# Patient Record
Sex: MALE | Race: WHITE
[De-identification: names, ages, dates, MRNs, and addresses within clinical notes are randomized per-mention and may not be internally consistent; named-entity substitution may affect disease eponyms.]

---

## 2018-01-17 ENCOUNTER — HOSPITAL ENCOUNTER (OUTPATIENT)
Dept: HOSPITAL 95 - ORSCSDS | Age: 83
Discharge: HOME | End: 2018-01-17
Payer: MEDICARE

## 2018-01-17 VITALS — WEIGHT: 185.25 LBS | HEIGHT: 70 IN | BODY MASS INDEX: 26.52 KG/M2

## 2018-01-17 DIAGNOSIS — D12.4: ICD-10-CM

## 2018-01-17 DIAGNOSIS — D50.9: Primary | ICD-10-CM

## 2018-01-17 DIAGNOSIS — Z79.899: ICD-10-CM

## 2018-01-17 DIAGNOSIS — D12.5: ICD-10-CM

## 2018-01-17 DIAGNOSIS — I10: ICD-10-CM

## 2018-01-17 DIAGNOSIS — Z87.891: ICD-10-CM

## 2018-01-17 DIAGNOSIS — K57.30: ICD-10-CM

## 2018-01-17 PROCEDURE — 0DBP8ZX EXCISION OF RECTUM, VIA NATURAL OR ARTIFICIAL OPENING ENDOSCOPIC, DIAGNOSTIC: ICD-10-PCS | Performed by: INTERNAL MEDICINE

## 2018-01-17 PROCEDURE — 0DBN8ZX EXCISION OF SIGMOID COLON, VIA NATURAL OR ARTIFICIAL OPENING ENDOSCOPIC, DIAGNOSTIC: ICD-10-PCS | Performed by: INTERNAL MEDICINE

## 2018-01-17 PROCEDURE — 0DBM8ZX EXCISION OF DESCENDING COLON, VIA NATURAL OR ARTIFICIAL OPENING ENDOSCOPIC, DIAGNOSTIC: ICD-10-PCS | Performed by: INTERNAL MEDICINE

## 2020-02-09 NOTE — NUR
1729 PT ADMITTED TO MEDICAL FLOOR VIA GURNEY. PT TRANSFERED TO BED
INDEPENDENTLY. PT ON RA, NO RESPIRATORY DISTRESS, OCCASSIONAL NONPRODUCTIVE
COUGH, PT DENIES SOB. PER ED RN AND DOCUMENTATION, FLU SWAB COMPLETED AT
[facility] AND WAS NEGATIVE, ORDER CANCELED. MED REC UPDATED.

## 2020-02-10 NOTE — NUR
NIGHT SHIFT SUMMARY
NO ACUTE CHANGES THIS SHIFT. PT AAOX4 AND INDEPENDENT IN ROOM. PLEASANT AND
COOPERATIVE WITH CARE. DENIES PAIN, N/V. BREATHING IMPROVED PER PT. LUNG
SOUNDS DIM IN BASES WITH SOME FAINT RHONCHI NOTED. VSS, WILL CONTINUE TO
MONITOR.

## 2020-02-10 NOTE — NUR
THE PATIENT GIVES THIS STUDENT NURSE PERMISSION TO PARTICIPATE IN HIS CARE FOR
THE MORNING OF 02/11/2020

## 2020-02-10 NOTE — NUR
SHIFT SUMMARY.
AOX4, INDEPENDENT IN ROOM, PLEASANT AND COOPERATIVE WITH CARE. LUNGS CLEAR.
BREATHING NON LABORED. RA. NO NEW CHANGES OR CONCERNS.

## 2020-02-11 NOTE — NUR
SHIFT SUMMARY
ASSUMED CARE OF PT @0505. PREVIOUS NURSE, JOSE ANTONIO RAMIREZ, REPORTED HE GAVE 500ML BOLUS
FOR A BP OF 87/47, WHICH INCREASED BP TO 99/60. DENIES DYSPNEA @REST, SPO2
>90% ON RA, E/U RESPIRATIONS. IND IN ROOM. DENIES N/V OR PAIN. CALL LIGHT IN
REACH.

## 2021-07-08 NOTE — NUR
ADMIT NOTE
 
PT ARRIVED TO PCU FROM ED VIA ED STRETCHER AT APPROX 2150. PT WAS SLID BY 4
STAFF FROM ED STRETCHER TO PCU BED. PT A&OX4. SP02>92% ON RA. SOB W/ EXERTION.
PT STATES HE "FEELS TIRED." TELEMETRY READS AFIB W/ BIGIMANY PVCS, HR 80'S,
PER TELEMETRY TECH. PT USED URINAL AT BEDSIDE TO VOID 50 MLS OF CLEAR, YELLOW
URINE. PT HAD WOUNDS ON L FORE ARM AND ABOVE R UPPER EYE FROM PREVIOUS FALL.
ARM WOUND CLEANED AND DRESSED. PT ARRIVED W/ PRBCS CURRENTLY INFUSING. PT
ORIENTED TO ROOM, CALL LIGHT. WIFE CALLED BY THIS RN AND UPDATED. CALL LIGHT
IN REACH.

## 2021-07-09 NOTE — NUR
SHIFT SUMMARY
 
NO ACUTE CHANGES THIS SHIFT. PT A&OX4, PLEASANT. SP02>92% ON RA. TELEMETRY
READS AFIB W/ BIGEMINY PVCS, HR 80'S-90'S. PT DENIES PAIN. PT VOIDED MULTIPLE
TIMES, SMALL AMOUNTS OF CLEAR YELLOW URINE INTO URINAL AT BEDSIDE. 2 UNITS
PRBC INFUSED PER SHIFT. PT HAS ABRASION ON L FOREARM, DRESSED AND CLEANED.
CALL LIGHT IN REACH. WILL GIVE REPORT TO ONCOMING NURSE.

## 2021-07-09 NOTE — NUR
PT TRANSFER
PT BROUGHT TO ROOM 304 WITH ALL BELONGINGS AND CHART. BROUGHT BY WHEELCHAIR BY
CNA. REPORT GIVEN TO ANA AVELAR PRIOR TO TRANSFER.

## 2021-09-30 NOTE — NUR
09/30/21 1107 FACUNDO WISEMAN
SCOPE WAS NEVER PASSED DUE TO PT BECOMING UNSTABLE AFTER 2 DOSES
OF PROPOFOL. 20 MG IV AND 20 MG IV FOR A TOTAL OF 40 MG. ANESTHESIA
WAS CALLED, PT STABALIZED AND TAKEN TO THE ER PER DR. BULLARD AND DR. TARIQ'S ORDERS FOR CARDIAC EVALUATION.

## 2021-10-06 NOTE — NUR
Since arrival, pt has been in bigeminy and sinus bradycardia, remains
asymptomatic.  Ate dinner, then has been sleeping since then.  Told me earlier
that the forehead wound was from falling asleep while in his chair and then
falling forward.  NPO after midnight for pacemaker surgery in the morning.
Zoll monitor is at the bedside, but not attached to the pt.  continuous
Telemetry monitoring in progress.

## 2021-10-07 NOTE — NUR
SHIFT SUMMARY
 
PATIENT IS RESTING IN BED COMFORTABLY. BED IS IN LOW POSITION. CALL LIGHT IS
IN REACH. VITALS HAVE BEEN STABLE EXCEPT FOR HEART RATE AND HEART RHYTHM. NO
ACUTE EVENT DURING THE NIGHT. NO COMPLAINTS OF PAIN. PATIENT IS NPO FOR
PLANNED PACEMAKER TODAY. WILL CONTINUE TO MONITOR. REPORT GIVEN TO CLARKE CAIN RN.

## 2021-10-08 NOTE — NUR
SHIFT SUMMARY
 
PATIENT IS RESTING IN BED COMFORTABLY. BED IS IN  LOW POSITION. CALL LIGHT IS
IN REACH. PATIENT COMPLAINED OF HIS LEFT SHOULDER BEING SORE BUT DID NOT WANT
ANYTHING FOR PAIN. VITALS WERE STABLE EXCEPT FOR HIS HEART RHYTHM BUT PATIENT
WAS ASYMPTOMATIC. NO ACUTE CHANGES. GOT UP TO THE SIDE OF THE BED WITH
ASSISTANCE TO USE THE URINAL. PATIENT IS RA SATURATING ABOVE 95%. LEFT
SHOULDER SITE IS C/D/I. WILL CONTINUE TO MONITOR. REPORT WILL BE GIVEN TO
DAYSHIFT RN.